# Patient Record
Sex: MALE | Race: WHITE | NOT HISPANIC OR LATINO
[De-identification: names, ages, dates, MRNs, and addresses within clinical notes are randomized per-mention and may not be internally consistent; named-entity substitution may affect disease eponyms.]

---

## 2018-12-18 ENCOUNTER — APPOINTMENT (OUTPATIENT)
Dept: OTHER | Facility: CLINIC | Age: 53
End: 2018-12-18

## 2020-06-01 ENCOUNTER — APPOINTMENT (OUTPATIENT)
Dept: OTHER | Facility: CLINIC | Age: 55
End: 2020-06-01
Payer: COMMERCIAL

## 2020-06-01 DIAGNOSIS — Z03.89 ENCOUNTER FOR OBSERVATION FOR OTHER SUSPECTED DISEASES AND CONDITIONS RULED OUT: ICD-10-CM

## 2020-06-01 PROCEDURE — 99441: CPT | Mod: 95

## 2020-06-01 PROCEDURE — 99396 PREV VISIT EST AGE 40-64: CPT | Mod: 95

## 2020-06-01 RX ORDER — AMLODIPINE BESYLATE 5 MG/1
5 TABLET ORAL
Qty: 90 | Refills: 0 | Status: ACTIVE | COMMUNITY
Start: 2020-03-06

## 2020-06-01 RX ORDER — CLONAZEPAM 2 MG/1
2 TABLET ORAL
Refills: 0 | Status: ACTIVE | COMMUNITY
Start: 2020-06-01

## 2020-06-01 NOTE — REASON FOR VISIT
[Follow-Up] : a follow-up visit [FreeTextEntry1] : SCC skin HX certified by [facility] as wtc related

## 2020-06-01 NOTE — HISTORY OF PRESENT ILLNESS
[Home] : at home, [unfilled] , at the time of the visit. [Verbal consent obtained from patient] : the patient, [unfilled] [Other Location: e.g. Home (Enter Location, City,State)___] : at [unfilled] [FreeTextEntry1] : 54 yo male [place] [employer].Retired .\par C/c .Asymptomatic.Here for f/up visit regarding skin cancer(remission now).\par On 09/14/01 has been assigned by employer*[employer]) to coordinate clean up of "GZ" related debris.\par Used to work there 12H/day x 6days/week for almost 3 months,then reassigned.\par Denies any injuries or illnesses while there or thereafter.\par certified by [facility] Tx program: Squamous Cell carcinoma Scalp. \par  \par \par \par

## 2020-06-01 NOTE — HEALTH RISK ASSESSMENT
[ColonoscopyDate] : 2018 [Patient reported colonoscopy was normal] : Patient reported colonoscopy was normal

## 2020-06-01 NOTE — DISCUSSION/SUMMARY
[Other Location: e.g. Home (Enter Location, City,State)___] : at [unfilled] [Home] : at home, [unfilled] , at the time of the visit. [Verbal consent obtained from patient] : the patient, [unfilled] [Patient seen for WTC Monitoring ___] : Patient was seen for WTC monitoring [unfilled] [Please See Note in Chart and Documentation in Trial DB] : Please see note in chart and documentation in Trial DB. [FreeTextEntry3] : 56 yo male [place] [employer].Retired\par \par On 09/14/01 has been assigned by employer*[employer]) to coordinate clean up of "GZ" related debris.\par Used to work there 12 H/day x 6 days/week for almost 3 months,then reassigned.\par Denies any injuries or illnesses while there or thereafter.Claims that first onset of heartburn occurred approximately 2 years ago.\par \par \par ROS:see HPI and PMH\par Squamous Cell Carcinoma of the scalp/skin -01/2008 (Dr.Mark Chen) by hx.\par PE:  deferred \par spirometry deferred\par colonoscopy - stated that had one 18 months ago \par assessment and Plan:\par CXR deferred \par \par Derm referral for SCC f/up \par

## 2020-06-01 NOTE — DISCUSSION/SUMMARY
[Home] : at home, [unfilled] , at the time of the visit. [Other Location: e.g. Home (Enter Location, City,State)___] : at [unfilled] [Verbal consent obtained from patient] : the patient, [unfilled] [Patient seen for WTC Monitoring ___] : Patient was seen for WTC monitoring [unfilled] [Please See Note in Chart and Documentation in Trial DB] : Please see note in chart and documentation in Trial DB. [FreeTextEntry3] : 54 yo male [place] [employer].Retired\par \par On 09/14/01 has been assigned by employer*[employer]) to coordinate clean up of "GZ" related debris.\par Used to work there 12 H/day x 6 days/week for almost 3 months,then reassigned.\par Denies any injuries or illnesses while there or thereafter.Claims that first onset of heartburn occurred approximately 2 years ago.\par \par \par ROS:see HPI and PMH\par Squamous Cell Carcinoma of the scalp/skin -01/2008 (Dr.Mark Chen) by hx.\par PE:  deferred \par spirometry deferred\par colonoscopy - stated that had one 18 months ago \par assessment and Plan:\par CXR deferred \par \par Derm referral for SCC f/up \par

## 2020-06-01 NOTE — REASON FOR VISIT
[Follow-Up] : a follow-up visit [FreeTextEntry1] : SCC skin HX certified by [facility] as wtc related

## 2020-06-01 NOTE — HISTORY OF PRESENT ILLNESS
[Home] : at home, [unfilled] , at the time of the visit. [Other Location: e.g. Home (Enter Location, City,State)___] : at [unfilled] [Verbal consent obtained from patient] : the patient, [unfilled] [FreeTextEntry1] : 56 yo male [place] [employer].Retired .\par C/c .Asymptomatic.Here for f/up visit regarding skin cancer(remission now).\par On 09/14/01 has been assigned by employer*[employer]) to coordinate clean up of "GZ" related debris.\par Used to work there 12H/day x 6days/week for almost 3 months,then reassigned.\par Denies any injuries or illnesses while there or thereafter.\par certified by [facility] Tx program: Squamous Cell carcinoma Scalp. \par  \par \par \par

## 2020-06-03 ENCOUNTER — FORM ENCOUNTER (OUTPATIENT)
Age: 55
End: 2020-06-03

## 2020-10-01 ENCOUNTER — FORM ENCOUNTER (OUTPATIENT)
Age: 55
End: 2020-10-01

## 2021-10-06 ENCOUNTER — NON-APPOINTMENT (OUTPATIENT)
Age: 56
End: 2021-10-06

## 2021-10-06 ENCOUNTER — APPOINTMENT (OUTPATIENT)
Dept: OTHER | Facility: CLINIC | Age: 56
End: 2021-10-06
Payer: COMMERCIAL

## 2021-10-06 PROCEDURE — 99441: CPT | Mod: 95

## 2021-10-06 PROCEDURE — 99396 PREV VISIT EST AGE 40-64: CPT | Mod: 95

## 2021-10-06 NOTE — HISTORY OF PRESENT ILLNESS
[Home] : at home, [unfilled] , at the time of the visit. [Other Location: e.g. Home (Enter Location, City,State)___] : at [unfilled] [Verbal consent obtained from patient] : the patient, [unfilled] [FreeTextEntry1] : 57 yo male [place] [employer].Retired .\par \par On 09/14/01 has been assigned by employer*[employer]) to coordinate clean up of "GZ" related debris.\par Used to work there 12H/day x 6days/week for almost 3 months,then reassigned.\par Denies any injuries or illnesses while there or thereafter.\par certified by [facility] Tx program: Squamous Cell carcinoma Scalp. \par  followed by Derm at [facility] \par  last seen a year ago \par needs a follow up scheduled \par  \par \par \par

## 2021-10-06 NOTE — HISTORY OF PRESENT ILLNESS
[Home] : at home, [unfilled] , at the time of the visit. [Other Location: e.g. Home (Enter Location, City,State)___] : at [unfilled] [Verbal consent obtained from patient] : the patient, [unfilled] [FreeTextEntry1] : 55 yo male [place] [employer].Retired .\par \par On 09/14/01 has been assigned by employer*[employer]) to coordinate clean up of "GZ" related debris.\par Used to work there 12H/day x 6days/week for almost 3 months,then reassigned.\par Denies any injuries or illnesses while there or thereafter.\par certified by [facility] Tx program: Squamous Cell carcinoma Scalp. \par  followed by Derm at [facility] \par  last seen a year ago \par needs a follow up scheduled \par  \par \par \par

## 2021-10-06 NOTE — DISCUSSION/SUMMARY
[Home] : at home, [unfilled] , at the time of the visit. [Other Location: e.g. Home (Enter Location, City,State)___] : at [unfilled] [Verbal consent obtained from patient] : the patient, [unfilled] [Patient seen for WTC Monitoring ___] : Patient was seen for WTC monitoring [unfilled] [Please See Note in Chart and Documentation in Trial DB] : Please see note in chart and documentation in Trial DB. [FreeTextEntry3] : 57 yo male [place] [employer].Retired\par \par On 09/14/01 has been assigned by employer*[employer]) to coordinate clean up of "GZ" related debris.\par Used to work there 12 H/day x 6 days/week for almost 3 months,then reassigned.\par Denies any injuries or illnesses while there or thereafter.Claims that first onset of heartburn occurred approximately 2 years ago.\par \par \par ROS:see HPI and PMH\par Squamous Cell Carcinoma of the scalp/skin -01/2008 (Dr.Mark Chen) by hx.\par PE:  deferred \par spirometry deferred\par \par assessment and Plan:\par CXR deferred \par \par Derm referral for SCC f/up \par

## 2021-10-06 NOTE — REASON FOR VISIT
[Follow-Up] : a follow-up visit [FreeTextEntry1] : SCC skin HX certified by [facility] as wtc related

## 2021-10-06 NOTE — REASON FOR VISIT
[Follow-Up] : a follow-up visit [FreeTextEntry1] : SCC skin HX certified by [facility] as wtc related

## 2021-12-05 ENCOUNTER — FORM ENCOUNTER (OUTPATIENT)
Age: 56
End: 2021-12-05

## 2021-12-29 ENCOUNTER — FORM ENCOUNTER (OUTPATIENT)
Age: 56
End: 2021-12-29

## 2023-05-10 ENCOUNTER — RESULT CHARGE (OUTPATIENT)
Age: 58
End: 2023-05-10

## 2023-05-11 ENCOUNTER — APPOINTMENT (OUTPATIENT)
Dept: OTHER | Facility: CLINIC | Age: 58
End: 2023-05-11
Payer: COMMERCIAL

## 2023-05-11 ENCOUNTER — OUTPATIENT (OUTPATIENT)
Dept: OUTPATIENT SERVICES | Facility: HOSPITAL | Age: 58
LOS: 1 days | End: 2023-05-11
Payer: COMMERCIAL

## 2023-05-11 VITALS
HEART RATE: 111 BPM | WEIGHT: 170 LBS | BODY MASS INDEX: 24.34 KG/M2 | SYSTOLIC BLOOD PRESSURE: 157 MMHG | TEMPERATURE: 98.1 F | HEIGHT: 70 IN | OXYGEN SATURATION: 98 % | DIASTOLIC BLOOD PRESSURE: 82 MMHG

## 2023-05-11 DIAGNOSIS — Z04.9 ENCOUNTER FOR EXAMINATION AND OBSERVATION FOR UNSPECIFIED REASON: ICD-10-CM

## 2023-05-11 DIAGNOSIS — C80.1 MALIGNANT (PRIMARY) NEOPLASM, UNSPECIFIED: ICD-10-CM

## 2023-05-11 DIAGNOSIS — C80.1 OTHER SYMPTOMS AND SIGNS CONCERNING FOOD AND FLUID INTAKE: ICD-10-CM

## 2023-05-11 DIAGNOSIS — R63.8 OTHER SYMPTOMS AND SIGNS CONCERNING FOOD AND FLUID INTAKE: ICD-10-CM

## 2023-05-11 DIAGNOSIS — Z12.9 ENCOUNTER FOR SCREENING FOR MALIGNANT NEOPLASM, SITE UNSPECIFIED: ICD-10-CM

## 2023-05-11 DIAGNOSIS — Z23 ENCOUNTER FOR IMMUNIZATION: ICD-10-CM

## 2023-05-11 PROCEDURE — 71046 X-RAY EXAM CHEST 2 VIEWS: CPT

## 2023-05-11 PROCEDURE — 99213 OFFICE O/P EST LOW 20 MIN: CPT | Mod: 25

## 2023-05-11 PROCEDURE — 99396 PREV VISIT EST AGE 40-64: CPT | Mod: 25

## 2023-05-11 PROCEDURE — 94010 BREATHING CAPACITY TEST: CPT

## 2023-05-11 PROCEDURE — 71046 X-RAY EXAM CHEST 2 VIEWS: CPT | Mod: 26

## 2023-05-15 LAB
ALBUMIN SERPL ELPH-MCNC: 4.8 G/DL
ALP BLD-CCNC: 64 U/L
ALT SERPL-CCNC: 15 U/L
ANION GAP SERPL CALC-SCNC: 13 MMOL/L
APPEARANCE: CLEAR
AST SERPL-CCNC: 24 U/L
BACTERIA: NEGATIVE /HPF
BASOPHILS # BLD AUTO: 0.04 K/UL
BASOPHILS NFR BLD AUTO: 0.6 %
BILIRUB SERPL-MCNC: 1 MG/DL
BILIRUBIN URINE: NEGATIVE
BLOOD URINE: ABNORMAL
BUN SERPL-MCNC: 17 MG/DL
CALCIUM SERPL-MCNC: 9.7 MG/DL
CAST: 0 /LPF
CHLORIDE SERPL-SCNC: 101 MMOL/L
CHOLEST SERPL-MCNC: 172 MG/DL
CO2 SERPL-SCNC: 22 MMOL/L
COLOR: YELLOW
CREAT SERPL-MCNC: 1.41 MG/DL
EGFR: 57 ML/MIN/1.73M2
EOSINOPHIL # BLD AUTO: 0.04 K/UL
EOSINOPHIL NFR BLD AUTO: 0.6 %
EPITHELIAL CELLS: 0 /HPF
GLUCOSE QUALITATIVE U: NEGATIVE MG/DL
GLUCOSE SERPL-MCNC: 107 MG/DL
HCT VFR BLD CALC: 45.9 %
HDLC SERPL-MCNC: 54 MG/DL
HGB BLD-MCNC: 15.4 G/DL
IMM GRANULOCYTES NFR BLD AUTO: 0.4 %
KETONES URINE: NEGATIVE MG/DL
LDLC SERPL CALC-MCNC: 96 MG/DL
LEUKOCYTE ESTERASE URINE: ABNORMAL
LYMPHOCYTES # BLD AUTO: 2.54 K/UL
LYMPHOCYTES NFR BLD AUTO: 36.4 %
MAN DIFF?: NORMAL
MCHC RBC-ENTMCNC: 29.1 PG
MCHC RBC-ENTMCNC: 33.6 GM/DL
MCV RBC AUTO: 86.8 FL
MICROSCOPIC-UA: NORMAL
MONOCYTES # BLD AUTO: 0.5 K/UL
MONOCYTES NFR BLD AUTO: 7.2 %
NEUTROPHILS # BLD AUTO: 3.83 K/UL
NEUTROPHILS NFR BLD AUTO: 54.8 %
NITRITE URINE: NEGATIVE
NONHDLC SERPL-MCNC: 118 MG/DL
PH URINE: 6
PLATELET # BLD AUTO: 281 K/UL
POTASSIUM SERPL-SCNC: 4.7 MMOL/L
PROT SERPL-MCNC: 7.4 G/DL
PROTEIN URINE: NEGATIVE MG/DL
PSA FREE FLD-MCNC: 9 %
PSA FREE SERPL-MCNC: 1.38 NG/ML
PSA SERPL-MCNC: 14.9 NG/ML
RBC # BLD: 5.29 M/UL
RBC # FLD: 12.7 %
RED BLOOD CELLS URINE: 0 /HPF
REVIEW: NORMAL
SODIUM SERPL-SCNC: 137 MMOL/L
SPECIFIC GRAVITY URINE: 1.01
TRIGL SERPL-MCNC: 108 MG/DL
UROBILINOGEN URINE: 0.2 MG/DL
WBC # FLD AUTO: 6.98 K/UL
WHITE BLOOD CELLS URINE: 1 /HPF

## 2024-12-03 ENCOUNTER — EMERGENCY (EMERGENCY)
Facility: HOSPITAL | Age: 59
LOS: 1 days | Discharge: HOME | End: 2024-12-03
Attending: EMERGENCY MEDICINE
Payer: COMMERCIAL

## 2024-12-03 ENCOUNTER — RESULT REVIEW (OUTPATIENT)
Age: 59
End: 2024-12-03

## 2024-12-03 VITALS
RESPIRATION RATE: 18 BRPM | TEMPERATURE: 98 F | HEART RATE: 104 BPM | DIASTOLIC BLOOD PRESSURE: 98 MMHG | HEIGHT: 70 IN | SYSTOLIC BLOOD PRESSURE: 170 MMHG | WEIGHT: 166.01 LBS | OXYGEN SATURATION: 99 %

## 2024-12-03 LAB
ADD ON TEST-SPECIMEN IN LAB: SIGNIFICANT CHANGE UP
ADD ON TEST-SPECIMEN IN LAB: SIGNIFICANT CHANGE UP
ALBUMIN SERPL ELPH-MCNC: 4.6 G/DL — SIGNIFICANT CHANGE UP (ref 3.3–5)
ALP SERPL-CCNC: 74 U/L — SIGNIFICANT CHANGE UP (ref 40–120)
ALT FLD-CCNC: 18 U/L — SIGNIFICANT CHANGE UP (ref 10–45)
ANION GAP SERPL CALC-SCNC: 13 MMOL/L — SIGNIFICANT CHANGE UP (ref 5–17)
APTT BLD: 31.5 SEC — SIGNIFICANT CHANGE UP (ref 24.5–35.6)
AST SERPL-CCNC: 26 U/L — SIGNIFICANT CHANGE UP (ref 10–40)
BASOPHILS # BLD AUTO: 0.06 K/UL — SIGNIFICANT CHANGE UP (ref 0–0.2)
BASOPHILS NFR BLD AUTO: 0.5 % — SIGNIFICANT CHANGE UP (ref 0–2)
BILIRUB SERPL-MCNC: 0.6 MG/DL — SIGNIFICANT CHANGE UP (ref 0.2–1.2)
BUN SERPL-MCNC: 14 MG/DL — SIGNIFICANT CHANGE UP (ref 7–23)
CALCIUM SERPL-MCNC: 9.8 MG/DL — SIGNIFICANT CHANGE UP (ref 8.4–10.5)
CHLORIDE SERPL-SCNC: 101 MMOL/L — SIGNIFICANT CHANGE UP (ref 96–108)
CO2 SERPL-SCNC: 23 MMOL/L — SIGNIFICANT CHANGE UP (ref 22–31)
CREAT SERPL-MCNC: 1.33 MG/DL — HIGH (ref 0.5–1.3)
EGFR: 61 ML/MIN/1.73M2 — SIGNIFICANT CHANGE UP
EOSINOPHIL # BLD AUTO: 0.06 K/UL — SIGNIFICANT CHANGE UP (ref 0–0.5)
EOSINOPHIL NFR BLD AUTO: 0.5 % — SIGNIFICANT CHANGE UP (ref 0–6)
GLUCOSE SERPL-MCNC: 134 MG/DL — HIGH (ref 70–99)
HCT VFR BLD CALC: 47.7 % — SIGNIFICANT CHANGE UP (ref 39–50)
HGB BLD-MCNC: 15.6 G/DL — SIGNIFICANT CHANGE UP (ref 13–17)
IMM GRANULOCYTES NFR BLD AUTO: 0.3 % — SIGNIFICANT CHANGE UP (ref 0–0.9)
INR BLD: 1.02 RATIO — SIGNIFICANT CHANGE UP (ref 0.85–1.16)
LYMPHOCYTES # BLD AUTO: 17.4 % — SIGNIFICANT CHANGE UP (ref 13–44)
LYMPHOCYTES # BLD AUTO: 2.03 K/UL — SIGNIFICANT CHANGE UP (ref 1–3.3)
MAGNESIUM SERPL-MCNC: 2.1 MG/DL — SIGNIFICANT CHANGE UP (ref 1.6–2.6)
MCHC RBC-ENTMCNC: 28.6 PG — SIGNIFICANT CHANGE UP (ref 27–34)
MCHC RBC-ENTMCNC: 32.7 G/DL — SIGNIFICANT CHANGE UP (ref 32–36)
MCV RBC AUTO: 87.4 FL — SIGNIFICANT CHANGE UP (ref 80–100)
MONOCYTES # BLD AUTO: 0.75 K/UL — SIGNIFICANT CHANGE UP (ref 0–0.9)
MONOCYTES NFR BLD AUTO: 6.4 % — SIGNIFICANT CHANGE UP (ref 2–14)
NEUTROPHILS # BLD AUTO: 8.7 K/UL — HIGH (ref 1.8–7.4)
NEUTROPHILS NFR BLD AUTO: 74.9 % — SIGNIFICANT CHANGE UP (ref 43–77)
NRBC # BLD: 0 /100 WBCS — SIGNIFICANT CHANGE UP (ref 0–0)
NT-PROBNP SERPL-SCNC: 43 PG/ML — SIGNIFICANT CHANGE UP (ref 0–300)
PLATELET # BLD AUTO: 280 K/UL — SIGNIFICANT CHANGE UP (ref 150–400)
POTASSIUM SERPL-MCNC: 3.7 MMOL/L — SIGNIFICANT CHANGE UP (ref 3.5–5.3)
POTASSIUM SERPL-SCNC: 3.7 MMOL/L — SIGNIFICANT CHANGE UP (ref 3.5–5.3)
PROT SERPL-MCNC: 8.2 G/DL — SIGNIFICANT CHANGE UP (ref 6–8.3)
PROTHROM AB SERPL-ACNC: 11.6 SEC — SIGNIFICANT CHANGE UP (ref 9.9–13.4)
RBC # BLD: 5.46 M/UL — SIGNIFICANT CHANGE UP (ref 4.2–5.8)
RBC # FLD: 12.6 % — SIGNIFICANT CHANGE UP (ref 10.3–14.5)
SODIUM SERPL-SCNC: 137 MMOL/L — SIGNIFICANT CHANGE UP (ref 135–145)
TROPONIN T, HIGH SENSITIVITY RESULT: <6 NG/L — SIGNIFICANT CHANGE UP (ref 0–51)
WBC # BLD: 11.64 K/UL — HIGH (ref 3.8–10.5)
WBC # FLD AUTO: 11.64 K/UL — HIGH (ref 3.8–10.5)

## 2024-12-03 PROCEDURE — 93306 TTE W/DOPPLER COMPLETE: CPT | Mod: 26

## 2024-12-03 PROCEDURE — 75574 CT ANGIO HRT W/3D IMAGE: CPT | Mod: 26,MC

## 2024-12-03 PROCEDURE — 71046 X-RAY EXAM CHEST 2 VIEWS: CPT | Mod: 26

## 2024-12-03 PROCEDURE — 99223 1ST HOSP IP/OBS HIGH 75: CPT

## 2024-12-03 RX ORDER — AMLODIPINE BESYLATE 10 MG/1
5 TABLET ORAL DAILY
Refills: 0 | Status: ACTIVE | OUTPATIENT
Start: 2024-12-03 | End: 2025-11-01

## 2024-12-03 RX ORDER — ACETAMINOPHEN 500MG 500 MG/1
975 TABLET, COATED ORAL ONCE
Refills: 0 | Status: COMPLETED | OUTPATIENT
Start: 2024-12-03 | End: 2024-12-03

## 2024-12-03 RX ORDER — IBUPROFEN 200 MG
400 TABLET ORAL ONCE
Refills: 0 | Status: COMPLETED | OUTPATIENT
Start: 2024-12-03 | End: 2024-12-03

## 2024-12-03 RX ORDER — PANTOPRAZOLE SODIUM 40 MG/1
40 TABLET, DELAYED RELEASE ORAL ONCE
Refills: 0 | Status: COMPLETED | OUTPATIENT
Start: 2024-12-03 | End: 2024-12-03

## 2024-12-03 RX ORDER — MAGNESIUM, ALUMINUM HYDROXIDE 200-225/5
30 SUSPENSION, ORAL (FINAL DOSE FORM) ORAL ONCE
Refills: 0 | Status: COMPLETED | OUTPATIENT
Start: 2024-12-03 | End: 2024-12-03

## 2024-12-03 RX ORDER — SODIUM CHLORIDE 9 MG/ML
1000 INJECTION, SOLUTION INTRAMUSCULAR; INTRAVENOUS; SUBCUTANEOUS ONCE
Refills: 0 | Status: COMPLETED | OUTPATIENT
Start: 2024-12-03 | End: 2024-12-03

## 2024-12-03 RX ORDER — CALCIUM CARBONATE 500(1250)
1 TABLET ORAL ONCE
Refills: 0 | Status: COMPLETED | OUTPATIENT
Start: 2024-12-03 | End: 2024-12-03

## 2024-12-03 RX ORDER — NITROGLYCERIN 0.4MG/HR
0.4 PATCH, TRANSDERMAL 24 HOURS TRANSDERMAL
Refills: 0 | Status: COMPLETED | OUTPATIENT
Start: 2024-12-03 | End: 2024-12-03

## 2024-12-03 RX ADMIN — Medication 30 MILLILITER(S): at 21:05

## 2024-12-03 RX ADMIN — Medication 0.4 MILLIGRAM(S): at 07:36

## 2024-12-03 RX ADMIN — PANTOPRAZOLE SODIUM 40 MILLIGRAM(S): 40 TABLET, DELAYED RELEASE ORAL at 17:09

## 2024-12-03 RX ADMIN — Medication 324 MILLIGRAM(S): at 07:45

## 2024-12-03 RX ADMIN — SODIUM CHLORIDE 1000 MILLILITER(S): 9 INJECTION, SOLUTION INTRAMUSCULAR; INTRAVENOUS; SUBCUTANEOUS at 20:09

## 2024-12-03 RX ADMIN — Medication 400 MILLIGRAM(S): at 20:32

## 2024-12-03 RX ADMIN — Medication 0.4 MILLIGRAM(S): at 07:47

## 2024-12-03 RX ADMIN — ACETAMINOPHEN 500MG 975 MILLIGRAM(S): 500 TABLET, COATED ORAL at 17:10

## 2024-12-03 RX ADMIN — Medication 0.4 MILLIGRAM(S): at 12:14

## 2024-12-03 RX ADMIN — Medication 1 TABLET(S): at 08:32

## 2024-12-03 NOTE — ED PROVIDER NOTE - CLINICAL SUMMARY MEDICAL DECISION MAKING FREE TEXT BOX
Patient with chest tightness of uncertain etiology, abnormal EKG without priors for comparison.  BP stable, EKG with bifascicular block, will obtain trop, Dimer, CXR, provide symptom management for GERD, reassess, will likely admit for observation. Patient with chest tightness of uncertain etiology, abnormal EKG without priors for comparison.  BP stable, EKG with bifascicular block, will obtain trop, Dimer, CXR, provide symptom management for GERD, reassess, will likely admit for observation.    Admit note:  Patient chest pain improved to 5/10 with nitro but pressure is still active. Trop and Dimer negative. CXR clear, normal mediastinal borders.  Will admit for observation given ongoing chest pain, abnormal EKG, and risk factors.

## 2024-12-03 NOTE — ED ADULT NURSE NOTE - OBJECTIVE STATEMENT
60 yo presents to the ED from home. A&Ox4, ambulatory c/o chest pressure. pt reports midsternal CP started this morning 0330, woke pt from sleep. denies any SOB, diaphoresis associated with it. reports belching at the time. reports history of GERD, reports similar sensation but reports this has persisted and had elevated BP with it which concerned pt to come to ED. pt has history of HTN, on amlodipine. denies cardiac history. EKG done in triage. pt placed on CM. 18G inserted LAC. Patient undressed and placed into gown, call bell in hand and side rails up for safety. warm blanket provided, vital signs stable, pt in no acute distress.

## 2024-12-03 NOTE — ED PROVIDER NOTE - PHYSICAL EXAMINATION
Gen: Alert, mild distress, well appearing  Head: NC, AT, PERRL, EOMI, normal lids/conjunctiva  ENT: normal hearing, patent oropharynx without erythema/exudate, uvula midline  Neck: +supple, no tenderness/meningismus/JVD, +Trachea midline  Pulm: Bilateral BS, normal resp effort, no wheeze/stridor/retractions  CV: tachycardia, no M/R/G, +dist pulses  Abd: soft, NT/ND, Negative [place] signs, +BS, no palpable masses  Mskel: no edema/erythema/cyanosis  Skin: no rash, warm/dry  Neuro: AAOx3, no apparent sensory/motor deficits, coordination intact

## 2024-12-03 NOTE — ED CDU PROVIDER INITIAL DAY NOTE - PROGRESS NOTE DETAILS
Patient was about to be transported to echocardiogram however had recurrence of chest pain, called by RN and BP was 144/94 with heart rate 100s.  Patient evaluated bedside at that time by me, states pain feels similar to his earlier episode though not as severe.  Discussed with attending Dr. Serrato who initially evaluated patient, will give sublingual nitroglycerin, repeat EKG and troponin and reassess. Case discussed with unattached cardiologist Dr. Newby, will see patient but recommended changing stress test to CTA heart with coronary as feels better suited for patient. - Chava Funez PA-C CDU NOTE [place]: pt resting comfortably, feels improved after Protonix. NAD VSS. no events on tele.  CTC- mild disease. TTE- RVH.  I spoke with Dr. Newby, he will come see patient in AM for consult.  I discussed with Dr. STUART Acuña, would prefer patient stay for cards consult tomorrow unless patient feels very strongly about going home tonight. I discussed with patient, pt ok with staying overnight on monitor with plan for cards evaluation tomorrow morning.

## 2024-12-03 NOTE — ED CDU PROVIDER DISPOSITION NOTE - CARE PROVIDER_API CALL
Nicko Newby  Cardiology  [address] [place] [address], [address]  [place] [address]  Phone: [phone]  Fax: [phone]  Follow Up Time: 4-6 Days

## 2024-12-03 NOTE — ED PROVIDER NOTE - OBJECTIVE STATEMENT
Pertinent PMH/PSH/FHx/SHx and Review of Systems contained within:  Patient presents to the ED for chest pain.  Patient wife at bedside.  Reports waking up at 3:30 am, now 4 hours pt evaluation complaining of substernal pressure like chest pain, nonradiating.  Denies sob but feels he is not breathing comfortably and feels diaphoretic.  BP was elevated at home in the 160's systolic, denies any dizziness, near syncope, palpitations.  Does feel constant urge to burp but it is not relieving the sensation of pressure in his chest.  He also notes some discomfort in his jaw but feels it was there before.  EKG with sinus tach rate 111, RBBB + :AFB blocks noted, patient states no cardiac history or known EKG abnormalities.     Relevant PMHx/SHx/SOCHx/FAMH:  HTN, night terrors, GERD  Patient denies EtOH/tobacco/illicit substance use.    ROS: No fever/chills, No headache/photophobia/eye pain/changes in vision, No ear pain/sore throat/dysphagia, No palpitations, no SOB/cough/wheeze/stridor, No abdominal pain, No N/V/D/melena, no dysuria/frequency/discharge, No neck/back pain, no rash, no changes in neurological status/function. Pertinent PMH/PSH/FHx/SHx and Review of Systems contained within:  Patient presents to the ED for chest pain.  Patient wife at bedside.  Reports waking up at 3:30 am, now 4 hours pt evaluation complaining of substernal pressure like chest pain, nonradiating.  Denies sob but feels he is not breathing comfortably and feels diaphoretic.  BP was elevated at home in the 160's systolic, denies any dizziness, near syncope, palpitations.  Does feel constant urge to burp but it is not relieving the sensation of pressure in his chest.  He also notes some discomfort in his jaw but feels it was there before.  EKG with sinus tach rate 111, RBBB + :AFB blocks noted, patient states no cardiac history or known EKG abnormalities.     Relevant PMHx/SHx/SOCHx/FAMH:  HTN, night terrors, GERD  +Family history of heart disease in both parents  Patient denies EtOH/tobacco/illicit substance use.    ROS: No fever/chills, No headache/photophobia/eye pain/changes in vision, No ear pain/sore throat/dysphagia, No palpitations, no SOB/cough/wheeze/stridor, No abdominal pain, No N/V/D/melena, no dysuria/frequency/discharge, No neck/back pain, no rash, no changes in neurological status/function. Pertinent PMH/PSH/FHx/SHx and Review of Systems contained within:  Patient presents to the ED for chest pain.  Patient wife at bedside.  Reports waking up at 3:30 am, now 4 hours pt evaluation complaining of substernal pressure like chest pain, nonradiating.  Denies sob but feels he is not breathing comfortably and feels diaphoretic.  BP was elevated at home in the 160's systolic, denies any dizziness, near syncope, palpitations.  Does feel constant urge to burp but it is not relieving the sensation of pressure in his chest.  He also notes some discomfort in his jaw but feels it was there before.  EKG with sinus tach rate 111, RBBB + LAFB blocks noted, patient states no cardiac history or known EKG abnormalities.     Relevant PMHx/SHx/SOCHx/FAMH:  HTN, night terrors, GERD  +Family history of heart disease in both parents  Patient denies EtOH/tobacco/illicit substance use.    ROS: No fever/chills, No headache/photophobia/eye pain/changes in vision, No ear pain/sore throat/dysphagia, No palpitations, no SOB/cough/wheeze/stridor, No abdominal pain, No N/V/D/melena, no dysuria/frequency/discharge, No neck/back pain, no rash, no changes in neurological status/function.

## 2024-12-03 NOTE — ED CDU PROVIDER DISPOSITION NOTE - CLINICAL COURSE
59 year-old male PMHx HTN night terrors, GERD presents ED complaining of chest pain beginning at 330 this morning.  Patient states he is "never felt pain like this before", woke him from sleep.  Pain felt substernal chest region, non-radiating.  Does follow-up with a cardiologist as outpatient (Dr. Shahram Reyes, [facility]) and states has not had stress test recently. Does feel constant urge to burp but it is not relieving the sensation of pressure in his chest.  He also notes some discomfort in his jaw but feels it was there before. Denies shortness of breath, dizziness, lightheadedness, palpitations, headache, fever/chills, recent travel, back pain, numbness/tingling.     ED Course: EKG with sinus tach rate 111, RBBB + LAFB blocks noted. Labs including D-dimer and troponin negative. Chest x-ray without focal consolidation.  Patient sent to CDU for continued telemetry monitoring and further cardiac workup including TTE and stress test.  Patient had recurrent pain prior to CDU arrival. Cardiologist Dr. Newby consulted regarding pt, recommended changing stress to CTC pending his formal evaluation. In CDU ____________> 59 year-old male PMHx HTN night terrors, GERD presents ED complaining of chest pain beginning at 330 this morning.  Patient states he is "never felt pain like this before", woke him from sleep.  Pain felt substernal chest region, non-radiating.  Does follow-up with a cardiologist as outpatient and states has not had stress test recently. Does feel constant urge to burp but it is not relieving the sensation of pressure in his chest.  He also notes some discomfort in his jaw but feels it was there before. Denies shortness of breath, dizziness, lightheadedness, palpitations, headache, fever/chills, recent travel, back pain, numbness/tingling.     ED Course: EKG with sinus tach rate 111, RBBB + LAFB blocks noted. Labs including D-dimer and troponin negative. Chest x-ray without focal consolidation.  Patient sent to CDU for continued telemetry monitoring and further cardiac workup including TTE and stress test.  Patient had recurrent pain prior to CDU arrival. Cardiologist Dr. Newby consulted regarding pt, recommended changing stress to CTC pending his formal evaluation. In CDU TTE did not reveal any wall motion abnormalities, did note mildly enlarged RV cavity size with normal RV systolic function.  EF of 55% noted.  CTA of coronary arteries performed which showed calcium score of 8 and mild luminal narrowing in proximal LAD and mild luminal narrowing in mid RCA, otherwise no CAD noted.  Patient was given Protonix and Pepcid with improvement of symptoms.  He was evaluated by cardiologist Dr. Nicko Newby who cleared patient for discharge home from cardiology standpoint, recommended Pepcid on DC.  Case discussed with ED attending Dr. Zarate who evaluated patient, stable for discharge home with outpatient follow-up.  Stable at discharge.

## 2024-12-03 NOTE — ED CDU PROVIDER INITIAL DAY NOTE - CPE EDP NEURO NORM
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)  Patient with intractable epilepsy who continues to have events concerning for seizures  Currently on oxcarbazepine 900-1200, levetiracetam 1500 mg BID, and zonisamide 200 mg HS  He did not tolerate recent attempt at increase in zonisamide  Levetiracetam may be contributing to mood  Recommended that the patient have blood work done  Considering increasing oxcarbazepine and weaning off of zonisamide  Zonisamide may be helpful but does not tolerate a dose that will likely prevent seizures from occurring  This may also be contributing to his significant weight loss, although it is likely not the only cause of his weight loss as he is on a low dose of the medication and reports that he has not changed the way that he eats and does not exercise recently due to concerns that he will lose more weight  On exam he does have hyperreflexia of bilateral lower extremities and abnormal gait  Will check MRI T and C spine to rule out focal lesion in the setting of significant weight loss as well  Encouraged walking in neighborhood with wife to increase exercise  Labs ordered as well  Patient will call the office with continued events and concerns  Follow up in 3 months or sooner if needed with Dr Maikel Rao  Encouraged calling the office with any new concerns or issues in the meantime  Hyperreflexia  As above, MRI T and C spine ordered  Discussed with Dr Maikel Rao after visit, checking TSH, B12, MMA, ceruloplasm, copper as well  Imbalance  Again, MRI T and C spine ordered  Medications may contribute  Checking AED levels       Weight loss, unintentional  Patient with unintentional weight loss without changing eating habits  Weight in August 2020 was 242  Today weight is 204, 32 lbs of which in the last year  Checking TSH  Zonisamide may contribute to decreased appetite but he denies changing how much he eating  He is also exercising less due to concerns that he will lose more weight or have a seizure outside of his home  Encouraged walking in his neighborhood with family member to keep active  Consider weaning off of zonisamide to see if it is contributing once blood work is back if another medication can tolerate an increase in dose  He will Return for follow up in 3 months with Dr Marley Garcia  Subjective:  Hannah Cutler is a 39 y o  male with prior stroke (although recent MRI negative for ischemic injury), mitral valve thrombotic mass removal in 2015, and 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 , who is returning to Neurology office for follow up of his seizures  He was last seen in the office by Dr Marley Garcia on 5/17/2022  At that time, he continued to have events concerning for seizures since his prior appointment  Prior attempts to capture episodes in the EMU was not tolerated by patient  There was a noted possibility that his episodes could represent psychogenic non epileptic events but there were some features that would be concerning for seizure  There was noted improvement with the addition on zonisamide but he was unable to tolerate higher doses in the past  Recommended attempting to increase zonisamide again and if side effects, potentially decreased one of his other medications to allow him to tolerate a higher dose of zonisamide  Recommended 3 month follow up  There was a telephone call to report that he was having side effects of increased dose of zonisamide  He decreased dose back down to 200 mg HS  He was vomiting and had stomach cramps with the higher dose and had a breakthrough seizure  This one small seizure  in May shortly after his last visit   Missed a significant amount of time  No warning to this event  Was gone for about 3 hours wandering in his yard  No suspicion for larger GTC seizure  He reports that he has had two "almost seizures' where he got a really good feeling (typical aura) and went to the ground but did not have a bigger seizure (GTC)  Can last 1-several minutes  He does report weight loss as well without changing anything  August 2020 weight was 242  At office visit one year ago weight was 236 lbs  Today weight is 204 lbs  Feels that his seizures have actually been better since he has not been any bigger seizures with injuries  He has been seeing a therapist  Mark Kovacs with premonitions  Continues to occur daily  Current seizure medications:  - levetiracetam 1500 mg BID  - zonisamide 200 mg HS  - oxcarbazepine 900-1200   Latest Reference Range & Units 01/25/22 13:55   LEVETIRACETA (KEPPRA) 10 0 - 40 0 ug/mL 20 2   Valproic Acid, Free 6 0 - 22 0 ug/mL 7 3   VALPROIC ACID LEVEL, TOTAL  Rpt   VALPROIC ACID TOTAL 50 - 100 ug/mL 67   OXCARBAZEPINE 10 - 35 ug/mL 20      Latest Reference Range & Units 01/25/22 13:55   Sodium 136 - 145 mmol/L 142     Prior Seizure Medications: Phenytoin, Lamotrigine, Divalproex    His history was also obtained from his wife, who was present at today's visit  I reviewed prior neurology notes, EEG reports, MRI brain report and most recent labs, as documented in Epic/embraase, and summarized above  Objective:    Blood pressure 140/90, height 5' 11" (1 803 m), weight 92 5 kg (204 lb)  Physical Exam  No apparent distress  Appears well nourished  Mood appropriate for situation     Neurologic Exam  Mental status- alert and oriented to person, place, and time  Speech appropriate for conversation  Good attention and knowledge       Cranial Nerves- PERRL, VFFTC, EOMS normal, facial sensation and muscles symmetric, hearing intact bilaterally to finger rubs, tongue midline, palate rise symmetrical, shoulder shrug symmetrical     Motor- No pronator drift  5/5 strength all extremities  Moves all extremities freely  No tremor  Sensory-  Intact distally in all extremities to light touch  DTRs- 2+ and symmetric in bilateral upper extremities  3+ at bilateral patella  Gait- slow, cautious gait almost shuffling  Balance difficulties  Coordination- FNF intact  ROS:  Review of Systems   Constitutional: Positive for unexpected weight change (losing unexpected weight)  Negative for appetite change and fever  HENT: Negative  Negative for hearing loss, tinnitus, trouble swallowing and voice change  Eyes: Negative  Negative for photophobia and pain  Respiratory: Negative  Negative for shortness of breath  Cardiovascular: Negative  Negative for palpitations  Gastrointestinal: Negative  Negative for nausea and vomiting  Endocrine: Negative  Negative for cold intolerance  Genitourinary: Negative  Negative for dysuria, frequency and urgency  Musculoskeletal: Negative  Negative for myalgias and neck pain  Skin: Negative  Negative for rash  Neurological: Positive for seizures (5/24, patient is experiencing auras)  Negative for dizziness, tremors, syncope, facial asymmetry, speech difficulty, weakness, light-headedness, numbness and headaches  Hematological: Negative  Does not bruise/bleed easily  Psychiatric/Behavioral: Negative  Negative for confusion, hallucinations and sleep disturbance  All other systems reviewed and are negative      ROS obtained by MA and reviewed by myself  This note may have been created using voice recognition software  There may be unintentional errors such as grammatical errors, spelling errors, or pronoun errors  normal...

## 2024-12-03 NOTE — ED CDU PROVIDER INITIAL DAY NOTE - OBJECTIVE STATEMENT
Patient with pmh of HTN, night terrors, and GERD presents to the ED for chest pain.  Patient wife at bedside.  Reports waking up at 3:30 am, now 4 hours pt evaluation complaining of substernal pressure like chest pain, nonradiating.  Denies sob but feels he is not breathing comfortably and feels diaphoretic.  BP was elevated at home in the 160's systolic, denies any dizziness, near syncope, palpitations.  Does feel constant urge to burp but it is not relieving the sensation of pressure in his chest.  He also notes some discomfort in his jaw but feels it was there before.  EKG with sinus tach rate 111, RBBB + LAFB blocks noted, patient states no cardiac history or known EKG abnormalities. 59 year-old male PMHx HTN night terrors, GERD presents ED complaining of chest pain beginning at 330 this morning.  Patient states he is "never felt pain like this before", woke him from sleep.  Pain felt substernal chest region, non-radiating.  Does follow-up with a cardiologist as outpatient (Dr. Shahram Reyes, [facility]) and states has not had stress test recently. Does feel constant urge to burp but it is not relieving the sensation of pressure in his chest.  He also notes some discomfort in his jaw but feels it was there before. Denies shortness of breath, dizziness, lightheadedness, palpitations, headache, fever/chills, recent travel, back pain, numbness/tingling.       ED Course: EKG with sinus tach rate 111, RBBB + LAFB blocks noted. Labs including D-dimer and troponin negative. Chest x-ray without focal consolidation.  Patient sent to CDU for continued telemetry monitoring and further cardiac workup including TTE and stress test. Case d/w ED attending. 59 year-old male PMHx HTN (on amlodipine 5mg) night terrors, GERD presents ED complaining of chest pain beginning at 330 this morning.  Patient states he is "never felt pain like this before", woke him from sleep.  Pain felt substernal chest region, non-radiating.  Does follow-up with a cardiologist as outpatient (Dr. Shahram Reyes, [facility]) and states has not had stress test recently. Does feel constant urge to burp but it is not relieving the sensation of pressure in his chest.  He also notes some discomfort in his jaw but feels it was there before. Denies shortness of breath, dizziness, lightheadedness, palpitations, headache, fever/chills, recent travel, back pain, numbness/tingling.       ED Course: EKG with sinus tach rate 111, RBBB + LAFB blocks noted. Labs including D-dimer and troponin negative. Chest x-ray without focal consolidation.  Patient sent to CDU for continued telemetry monitoring and further cardiac workup including TTE and stress test. Case d/w ED attending. 59 year-old male PMHx HTN (on amlodipine 5mg) night terrors, GERD presents ED complaining of chest pain beginning at 330 this morning.  Patient states he has "never felt pain like this before", woke him from sleep.  Pain felt substernal chest region, non-radiating.  Does follow-up with a cardiologist as outpatient (Dr. Shahram Reyes, [facility]) and states has not had stress test recently. Does feel constant urge to burp but it is not relieving the sensation of pressure in his chest.  He also notes some discomfort in his jaw but feels it was there before. Denies shortness of breath, dizziness, lightheadedness, palpitations, headache, fever/chills, recent travel, back pain, numbness/tingling.       ED Course: EKG with sinus tach rate 111, RBBB + LAFB blocks noted. Labs including D-dimer and troponin negative. Chest x-ray without focal consolidation.  Patient sent to CDU for continued telemetry monitoring and further cardiac workup including TTE and stress test. Case d/w ED attending.

## 2024-12-03 NOTE — ED ADULT NURSE REASSESSMENT NOTE - NS ED NURSE REASSESS COMMENT FT1
Pt received from TRINY Clifton. Pt oriented to CDU & plan of care was discussed. Pt A&O x 4. Independent. Pt in CDU for telemetry. Pt verbalized 6/10 epigastric/midsternal chest pain. Ibuprofen and Maalox was administered as ordered. V/S stable, pt afebrile,  IV in place, patent and free of signs of infiltration. Pt resting in bed. Safety & comfort measures maintained. Call bell in reach. Care continues. Pt received from TRINY Clifton. Pt oriented to CDU & plan of care was discussed. Pt A&O x 4. Independent. Pt in CDU for telemetry and Cardiology consult. Patient had TTE and CT coronaries on 12/03. Pt verbalized 6/10 epigastric/midsternal chest pain. Ibuprofen and Maalox was administered as ordered. V/S stable, pt afebrile,  IV in place, patent and free of signs of infiltration. Pt resting in bed. Safety & comfort measures maintained. Call bell in reach. Care continues.

## 2024-12-03 NOTE — ED CDU PROVIDER INITIAL DAY NOTE - PHYSICAL EXAMINATION
Gen: Alert, minimal distress, well appearing  Head: NC, AT, EOMI, normal lids/conjunctiva  ENT: normal hearing, patent oropharynx without erythema/exudate, uvula midline  Neck: +supple, no tenderness/meningismus/JVD, +Trachea midline  Pulm: Bilateral BS, normal resp effort, no wheeze/stridor/retractions  CV: RRR, no M/R/G, +dist pulses  Abd: soft, NT/ND, Negative McRoberts signs, +BS, no palpable masses  Mskel: no edema/erythema/cyanosis  Skin: no rash, warm/dry  Neuro: AAOx3, no apparent sensory/motor deficits, coordination intact

## 2024-12-03 NOTE — ED CDU PROVIDER INITIAL DAY NOTE - CLINICAL SUMMARY MEDICAL DECISION MAKING FREE TEXT BOX
Patient with ongoing chest pain, HEart Score 3, significant hereditary risk factors concerning story, however negative initial trop and EKG, responded to nitro.  Will admit for monitoring, r/o ACS.

## 2024-12-03 NOTE — ED ADULT TRIAGE NOTE - CHIEF COMPLAINT QUOTE
Midsternal pressure like chest pain that woke pt out of sleep at 330 am, No associated SOB, Diaphoresis   No cardiac h(x)

## 2024-12-03 NOTE — ED ADULT NURSE REASSESSMENT NOTE - NS ED NURSE REASSESS COMMENT FT1
13.00 Received the Pt from  TRINY Mulligan Pt is Observed for CP for ECHO & CTC. Received the Pt A&OX 4  Pt obeys commands Lalitha N/V/D fever chills  SOB   Comfort care & safety measures continued  IV site looks clean & dry no signs of infiltration noted pt denies  pain IV site .Pt is oriented to the unit Plan of care explained .  Pt is advised to call for help  call bell with in the reach pt verbalized the understanding .  pending CDU  MD aguilar . GCS 15/15 A&OX 4 PERRLA  size 3 Strong upper & lower extremities steady gait  Pt is ambulatory & independent   No facial droop  No Hand Leg drop denies numbness tingling  Pt has chest discomfort  not in distress  Plan of care ongoing  14.00 Pt went for ECHO

## 2024-12-03 NOTE — ED CDU PROVIDER INITIAL DAY NOTE - ATTENDING APP SHARED VISIT CONTRIBUTION OF CARE
Patient p/w substernal chest pain of uncertain etiology, EKG abnormal without priors for comparison (bifascicular block), initial trop negative.  Chest pain improved with nitro but still active, patient also treated with TUMs and aspirin.  Trop and Dimer both negative, CXR unremarkable.  Will plan to admit for observation, no findings concerning for clear ACS, PE, dissection, or other acute findings, plan for serial trops, stress test. Patient p/w substernal chest pain of uncertain etiology, EKG abnormal without priors for comparison (bifascicular block), initial trop negative.  Chest pain improved with nitro but still active, patient also treated with TUMs and aspirin.  Trop and Dimer both negative, CXR unremarkable.  Will plan to admit for observation, no findings concerning for clear ACS, PE, dissection, or other acute findings, plan for serial trops, stress test.    Placed in CDU for ongoing monitoring.

## 2024-12-03 NOTE — ED CDU PROVIDER DISPOSITION NOTE - NSFOLLOWUPINSTRUCTIONS_ED_ALL_ED_FT
1. Follow up with your PMD in 1-2 days. Additionally please follow up with either your cardiologist or our cardiology clinic ([phone]) within the next 2-3 days for further evaluation/management regarding your symptoms.   2. Show copies of your reports given to you. Recommend Aspirin 81mg over the counter daily until further evaluation.  Take all of your other medications as previously prescribed.   3. If you develop any worsening or continued chest pain, shortness of breath, dizziness, weakness, abdominal pain, nausea/vomiting or any other concerning symptoms please return to the ED immediately. Follow up with your primary doctor in 2-3 days for re-evaluation. Additionally please follow up with either your cardiologist or the cardiologist Dr. Newby that evaluated you in the Emergency Department.     Nicko Newby  [address] [place] [address], [address]  [place] [address]  Phone: [phone]  Follow Up Time: 4-6 Days    Your creatinine (kidney function) was mildly elevated to 1.33 (Normal is below 1.30).  It is recommended you stay hydrated and repeat this test with your primary care doctor.      Additionally your CT scan of your coronary arteries revealed an incidental finding of small lung nodules on the left side of your lung.  Please follow-up with your primary care doctor regarding this finding.    Show copies of your reports given to you. Recommend Aspirin 81mg over the counter daily until further evaluation.  Take all of your other medications as previously prescribed.     Additionally it is recommended you start Pepcid 20 mg twice daily. You may buy this medication over-the-counter.    If you develop any worsening or continued chest pain, shortness of breath, dizziness, weakness, abdominal pain, nausea/vomiting or any other concerning symptoms please return to the Emergency Department immediately.

## 2024-12-03 NOTE — ED CDU PROVIDER DISPOSITION NOTE - PATIENT PORTAL LINK FT
You can access the FollowMyHealth Patient Portal offered by [facility] by registering at the following website: [email or web address]. By joining [employer]’s FollowMyHealth portal, you will also be able to view your health information using other applications (apps) compatible with our system.

## 2024-12-04 VITALS
OXYGEN SATURATION: 96 % | SYSTOLIC BLOOD PRESSURE: 138 MMHG | TEMPERATURE: 98 F | RESPIRATION RATE: 18 BRPM | DIASTOLIC BLOOD PRESSURE: 77 MMHG | HEART RATE: 82 BPM

## 2024-12-04 DIAGNOSIS — R07.9 CHEST PAIN, UNSPECIFIED: ICD-10-CM

## 2024-12-04 PROCEDURE — 99285 EMERGENCY DEPT VISIT HI MDM: CPT | Mod: 25

## 2024-12-04 PROCEDURE — 84484 ASSAY OF TROPONIN QUANT: CPT

## 2024-12-04 PROCEDURE — 83735 ASSAY OF MAGNESIUM: CPT

## 2024-12-04 PROCEDURE — 96374 THER/PROPH/DIAG INJ IV PUSH: CPT | Mod: XU

## 2024-12-04 PROCEDURE — 85610 PROTHROMBIN TIME: CPT

## 2024-12-04 PROCEDURE — 80053 COMPREHEN METABOLIC PANEL: CPT

## 2024-12-04 PROCEDURE — 36415 COLL VENOUS BLD VENIPUNCTURE: CPT

## 2024-12-04 PROCEDURE — 71046 X-RAY EXAM CHEST 2 VIEWS: CPT

## 2024-12-04 PROCEDURE — 85379 FIBRIN DEGRADATION QUANT: CPT

## 2024-12-04 PROCEDURE — 99239 HOSP IP/OBS DSCHRG MGMT >30: CPT

## 2024-12-04 PROCEDURE — C8929: CPT

## 2024-12-04 PROCEDURE — 93005 ELECTROCARDIOGRAM TRACING: CPT | Mod: XU

## 2024-12-04 PROCEDURE — 80061 LIPID PANEL: CPT

## 2024-12-04 PROCEDURE — 85025 COMPLETE CBC W/AUTO DIFF WBC: CPT

## 2024-12-04 PROCEDURE — 85730 THROMBOPLASTIN TIME PARTIAL: CPT

## 2024-12-04 PROCEDURE — G0378: CPT

## 2024-12-04 PROCEDURE — 75574 CT ANGIO HRT W/3D IMAGE: CPT | Mod: MC

## 2024-12-04 PROCEDURE — 83880 ASSAY OF NATRIURETIC PEPTIDE: CPT

## 2024-12-04 PROCEDURE — 83036 HEMOGLOBIN GLYCOSYLATED A1C: CPT

## 2024-12-04 RX ORDER — MAGNESIUM, ALUMINUM HYDROXIDE 200-225/5
30 SUSPENSION, ORAL (FINAL DOSE FORM) ORAL ONCE
Refills: 0 | Status: COMPLETED | OUTPATIENT
Start: 2024-12-04 | End: 2024-12-04

## 2024-12-04 RX ORDER — FAMOTIDINE 20 MG/1
20 TABLET, FILM COATED ORAL DAILY
Refills: 0 | Status: ACTIVE | OUTPATIENT
Start: 2024-12-04 | End: 2025-11-02

## 2024-12-04 RX ORDER — ACETAMINOPHEN 500MG 500 MG/1
975 TABLET, COATED ORAL ONCE
Refills: 0 | Status: COMPLETED | OUTPATIENT
Start: 2024-12-04 | End: 2024-12-04

## 2024-12-04 RX ORDER — ACETAMINOPHEN 500MG 500 MG/1
650 TABLET, COATED ORAL ONCE
Refills: 0 | Status: COMPLETED | OUTPATIENT
Start: 2024-12-04 | End: 2024-12-04

## 2024-12-04 RX ADMIN — AMLODIPINE BESYLATE 5 MILLIGRAM(S): 10 TABLET ORAL at 08:16

## 2024-12-04 RX ADMIN — Medication 30 MILLILITER(S): at 08:17

## 2024-12-04 RX ADMIN — ACETAMINOPHEN 500MG 650 MILLIGRAM(S): 500 TABLET, COATED ORAL at 08:16

## 2024-12-04 RX ADMIN — ACETAMINOPHEN 500MG 650 MILLIGRAM(S): 500 TABLET, COATED ORAL at 08:43

## 2024-12-04 RX ADMIN — ACETAMINOPHEN 500MG 975 MILLIGRAM(S): 500 TABLET, COATED ORAL at 03:20

## 2024-12-04 RX ADMIN — ACETAMINOPHEN 500MG 975 MILLIGRAM(S): 500 TABLET, COATED ORAL at 02:21

## 2024-12-04 RX ADMIN — FAMOTIDINE 20 MILLIGRAM(S): 20 TABLET, FILM COATED ORAL at 08:16

## 2024-12-04 NOTE — CONSULT NOTE ADULT - SUBJECTIVE AND OBJECTIVE BOX
CHIEF COMPLAINT:    HISTORY OF PRESENT ILLNESS: Patient presents to the ED for chest pain.  Patient wife at bedside.  Reports waking up at 3:30 am, now 4 hours pt evaluation complaining of substernal pressure like chest pain, nonradiating.  Denies sob but feels he is not breathing comfortably and feels diaphoretic.  BP was elevated at home in the 160's systolic, denies any dizziness, near syncope, palpitations.  Does feel constant urge to burp but it is not relieving the sensation of pressure in his chest.  He also notes some discomfort in his jaw but feels it was there before.  EKG with sinus tach rate 111, RBBB + LAFB blocks noted, patient states no cardiac history or known EKG abnormalities.     	Relevant PMHx/SHx/SOCHx/FAMH:  	HTN, night terrors, GERD  	+Family history of heart disease in both parents  	Patient denies EtOH/tobacco/illicit substance use.    PAST MEDICAL & SURGICAL HISTORY:  No pertinent past medical history              MEDICATIONS:  amLODIPine   Tablet 5 milliGRAM(s) Oral daily          famotidine    Tablet 20 milliGRAM(s) Oral daily          FAMILY HISTORY:      SOCIAL HISTORY:    [ ] Non-smoker  [ ] Smoker  [ ] Alcohol    Allergies    No Known Allergies    Intolerances    	    REVIEW OF SYSTEMS:  CONSTITUTIONAL: No fever, weight loss, or fatigue  EYES: No eye pain, visual disturbances, or discharge  ENMT:  No difficulty hearing, tinnitus, vertigo; No sinus or throat pain  NECK: No pain or stiffness  RESPIRATORY: No cough, wheezing, chills or hemoptysis; No Shortness of Breath  CARDIOVASCULAR: No chest pain, palpitations, passing out, dizziness, or leg swelling  GASTROINTESTINAL: No abdominal or epigastric pain. No nausea, vomiting, or hematemesis; No diarrhea or constipation. No melena or hematochezia.  GENITOURINARY: No dysuria, frequency, hematuria, or incontinence  NEUROLOGICAL: No headaches, memory loss, loss of strength, numbness, or tremors  SKIN: No itching, burning, rashes, or lesions   LYMPH Nodes: No enlarged glands  ENDOCRINE: No heat or cold intolerance; No hair loss  MUSCULOSKELETAL: No joint pain or swelling; No muscle, back, or extremity pain  PSYCHIATRIC: No depression, anxiety, mood swings, or difficulty sleeping  HEME/LYMPH: No easy bruising, or bleeding gums  ALLERY AND IMMUNOLOGIC: No hives or eczema	    [ ] All others negative	  [ ] Unable to obtain    PHYSICAL EXAM:  T(C): 36.6 (12-04-24 @ 08:07), Max: 36.9 (12-03-24 @ 12:24)  HR: 82 (12-04-24 @ 08:07) (64 - 109)  BP: 138/77 (12-04-24 @ 08:07) (105/63 - 144/94)  RR: 18 (12-04-24 @ 08:07) (16 - 19)  SpO2: 96% (12-04-24 @ 08:07) (95% - 100%)  Wt(kg): --  I&O's Summary      Appearance: Normal	  HEENT:   Normal oral mucosa, PERRL, EOMI	  Lymphatic: No lymphadenopathy  Cardiovascular: Normal S1 S2, No JVD, No murmurs, No edema  Respiratory: Lungs clear to auscultation	  Psychiatry: A & O x 3, Mood & affect appropriate  Gastrointestinal:  Soft, Non-tender, + BS	  Skin: No rashes, No ecchymoses, No cyanosis	  Neurologic: Non-focal  Extremities: Normal range of motion, No clubbing, cyanosis or edema  Vascular: Peripheral pulses palpable 2+ bilaterally    TELEMETRY: 	    ECG:  	  RADIOLOGY:  < from: CT Angio Heart and Coronaries w/ IV Cont (12.03.24 @ 14:59) >    ACC: [id number] EXAM:  CT ANGIO HEART CORONARY IC   ORDERED BY: SHAHID KHAN     PROCEDURE DATE:  12/03/2024          INTERPRETATION:  Clinical Indication: Chest pain, evaluate for coronary   artery disease.    Procedure:  ECG gated CT of the heart was initially performed without   contrast administration. An ECG gated coronary CT angiogram following   administration of 66 cc's of Omnipaque 350. Two dimensional or   three-dimensional images were reconstructed.    FINDINGS:    Coronary CalciumScore = 8 Agatston Units    Non-Coronary:    Heart size is normal. No pericardial effusion. Partially imaged intimal   calcifications involving the most upper aspect of the ascending aorta.   Evaluation of the partially imaged lungs bilateral lower lung dependent   subsegmental areas of atelectasis. 4 mm left upper lobe nodule abutting   the pleural surface suggestive of a tiny lymph node. 3 mm left lower lobe   calcified and left upper lobe calcified granuloma. Spinal degenerative   changes.    Coronary:    The left main coronary artery is patent.    Eccentric mixed calcified and noncalcified plaques within the proximal   segment of the left anterior descending artery resulting in mild (30-49%)   luminal narrowing. The remainder of the left anterior descending artery   appears patent. A small ramus intermedius artery is patent.    The left circumflex artery, a codominant vessel and a prominent obtuse   marginal artery are patent.    The proximal segment of the right coronary artery appears patent.   Noncalcified plaque within the mid segment of the right coronary artery   result in mild (30-49%) luminal narrowing. The remainder of the right   coronary artery, posterior descending artery and the acute marginal   arteries appear patent.    IMPRESSION:    CT coronary angiography shows:  LMCA: Patent.  LAD: Plaques within the proximal segment resulting in mild (30-49%)   luminal narrowing.  LCx: Patent.  RCA: Codominant vessel with noncalcified plaque within the mid segment   resulting in mild (30-49%) luminal narrowing.    --- End of Report ---            FIORDALIZA CESPEDES MD; Attending Radiologist  This document has been electronically signed. Dec  3 2024  4:37PM    < end of copied text >    OTHER: 	  	  LABS:	 	    CARDIAC MARKERS:      Troponin T, High Sensitivity Result: <6: * Troponin T, High Sensitivity Result: <6: *                             15.6   11.64 )-----------( 280      ( 03 Dec 2024 07:34 )             47.7     12-03    137  |  101  |  14  ----------------------------<  134[H]  3.7   |  23  |  1.33[H]    Ca    9.8      03 Dec 2024 07:34  Mg     2.1     12-03    TPro  8.2  /  Alb  4.6  /  TBili  0.6  /  DBili  x   /  AST  26  /  ALT  18  /  AlkPhos  74  12-03    proBNP:   Lipid Profile:   HgA1c:   TSH:

## 2024-12-04 NOTE — ED CDU PROVIDER SUBSEQUENT DAY NOTE - PROGRESS NOTE DETAILS
Patient seen this AM by cardiology attending Dr. Newby, cleared for discharge home from cardiology standpoint with recommendation for Pepcid and outpt f/u. Patient evaluated by ED attending Dr. Zarate, stable for discharge home without outpt cards f/u and pepcid as above. All results were discussed with patient at bedside by CDU team, he feels comfortable with plan for discharge home and was advised on strict return precautions.  - Chava Funez PA-C pt asymptomatic - no chest pain, sob, n/v.  Echo and CTCA mild lesions in LAD and RCA - pt see by Dr. Newby - pt is stable for d/c with Rx for pepcid and f/u as outpt. Patient seen this AM by cardiology attending Dr. Newby, cleared for discharge home from cardiology standpoint with recommendation for Pepcid and outpt f/u. Patient evaluated by ED attending Dr. Zarate, stable for discharge home without outpt cards f/u and pepcid as above. All results (including incidental pulm nodule findings on CTC) were discussed with patient at bedside by CDU team, he feels comfortable with plan for discharge home and was advised on strict return precautions.  - Chava Funez PA-C

## 2024-12-04 NOTE — CONSULT NOTE ADULT - ASSESSMENT
Patient presents to the ED for chest pain.  Patient wife at bedside.  Reports waking up at 3:30 am, now 4 hours pt evaluation complaining of substernal pressure like chest pain, nonradiating.  Denies sob but feels he is not breathing comfortably and feels diaphoretic.  BP was elevated at home in the 160's systolic, denies any dizziness, near syncope, palpitations.  Does feel constant urge to burp but it is not relieving the sensation of pressure in his chest.  He also notes some discomfort in his jaw but feels it was there before.  EKG with sinus tach rate 111, RBBB + LAFB blocks noted, patient states no cardiac history or known EKG abnormalities.     	Relevant PMHx/SHx/SOCHx/FAMH:  	HTN, night terrors, GERD  	+Family history of heart disease in both parents  	Patient denies EtOH/tobacco/illicit substance use.

## 2024-12-04 NOTE — CONSULT NOTE ADULT - PROBLEM SELECTOR RECOMMENDATION 9
Non obstructive CAD   Add ASA 81 daily   Lipitor 20 daily for now   Add Pepcid 40 bid   No cardiac objection to DC home

## 2024-12-04 NOTE — ED CDU PROVIDER SUBSEQUENT DAY NOTE - HISTORY
CDU PROGRESS NOTE VIOLET BECK: no interval change. pt resting comfortably, NAD. VSS. no events on tele. pending cardiologist eval. Will continue to monitor.

## 2025-04-28 ENCOUNTER — NON-APPOINTMENT (OUTPATIENT)
Age: 60
End: 2025-04-28

## 2025-05-20 NOTE — ED ADULT NURSE NOTE - CHIEF COMPLAINT QUOTE
P Quality Flow/Interdisciplinary Rounds Progress Note        Quality Flow Rounds held on May 20, 2025    Disciplines Attending:  Bedside Nurse, , , and Nursing Unit Leadership    Maricruz Booth was admitted on 5/19/2025  7:38 AM    Anticipated Discharge Date:       Disposition:    Richard Score:  Richard Scale Score: 22    [facility] RISK OF UNPLANNED READMISSION 2.0             7.6 Total Score        Discussed patient goal for the day, patient clinical progression, and barriers to discharge.  The following Goal(s) of the Day/Commitment(s) have been identified:  Report labs/diagnostics      Raven Mancia RN  May 20, 2025         Midsternal pressure like chest pain that woke pt out of sleep at 330 am, No associated SOB, Diaphoresis   No cardiac h(x)